# Patient Record
Sex: FEMALE | Race: OTHER | HISPANIC OR LATINO | URBAN - METROPOLITAN AREA
[De-identification: names, ages, dates, MRNs, and addresses within clinical notes are randomized per-mention and may not be internally consistent; named-entity substitution may affect disease eponyms.]

---

## 2023-09-10 ENCOUNTER — EMERGENCY (EMERGENCY)
Facility: HOSPITAL | Age: 33
LOS: 1 days | End: 2023-09-10
Attending: EMERGENCY MEDICINE
Payer: MEDICAID

## 2023-09-10 VITALS
OXYGEN SATURATION: 99 % | RESPIRATION RATE: 18 BRPM | TEMPERATURE: 98 F | DIASTOLIC BLOOD PRESSURE: 63 MMHG | HEART RATE: 67 BPM | SYSTOLIC BLOOD PRESSURE: 102 MMHG

## 2023-09-10 VITALS
HEART RATE: 73 BPM | OXYGEN SATURATION: 96 % | RESPIRATION RATE: 18 BRPM | SYSTOLIC BLOOD PRESSURE: 112 MMHG | DIASTOLIC BLOOD PRESSURE: 71 MMHG | WEIGHT: 131.4 LBS | TEMPERATURE: 98 F

## 2023-09-10 LAB
ALBUMIN SERPL ELPH-MCNC: 3.7 G/DL — SIGNIFICANT CHANGE UP (ref 3.5–5)
ALP SERPL-CCNC: 78 U/L — SIGNIFICANT CHANGE UP (ref 40–120)
ALT FLD-CCNC: 31 U/L DA — SIGNIFICANT CHANGE UP (ref 10–60)
ANION GAP SERPL CALC-SCNC: 5 MMOL/L — SIGNIFICANT CHANGE UP (ref 5–17)
APPEARANCE UR: CLEAR — SIGNIFICANT CHANGE UP
AST SERPL-CCNC: 13 U/L — SIGNIFICANT CHANGE UP (ref 10–40)
BASOPHILS # BLD AUTO: 0 K/UL — SIGNIFICANT CHANGE UP (ref 0–0.2)
BASOPHILS NFR BLD AUTO: 0 % — SIGNIFICANT CHANGE UP (ref 0–2)
BILIRUB SERPL-MCNC: 0.2 MG/DL — SIGNIFICANT CHANGE UP (ref 0.2–1.2)
BILIRUB UR-MCNC: NEGATIVE — SIGNIFICANT CHANGE UP
BUN SERPL-MCNC: 17 MG/DL — SIGNIFICANT CHANGE UP (ref 7–18)
CALCIUM SERPL-MCNC: 8.5 MG/DL — SIGNIFICANT CHANGE UP (ref 8.4–10.5)
CHLORIDE SERPL-SCNC: 111 MMOL/L — HIGH (ref 96–108)
CO2 SERPL-SCNC: 22 MMOL/L — SIGNIFICANT CHANGE UP (ref 22–31)
COLOR SPEC: YELLOW — SIGNIFICANT CHANGE UP
CREAT SERPL-MCNC: 0.87 MG/DL — SIGNIFICANT CHANGE UP (ref 0.5–1.3)
DIFF PNL FLD: ABNORMAL
EGFR: 90 ML/MIN/1.73M2 — SIGNIFICANT CHANGE UP
EOSINOPHIL # BLD AUTO: 0.3 K/UL — SIGNIFICANT CHANGE UP (ref 0–0.5)
EOSINOPHIL NFR BLD AUTO: 4 % — SIGNIFICANT CHANGE UP (ref 0–6)
GLUCOSE SERPL-MCNC: 95 MG/DL — SIGNIFICANT CHANGE UP (ref 70–99)
GLUCOSE UR QL: NEGATIVE MG/DL — SIGNIFICANT CHANGE UP
HCG SERPL-ACNC: <1 MIU/ML — SIGNIFICANT CHANGE UP
HCG UR QL: NEGATIVE — SIGNIFICANT CHANGE UP
HCT VFR BLD CALC: 36.1 % — SIGNIFICANT CHANGE UP (ref 34.5–45)
HGB BLD-MCNC: 11.8 G/DL — SIGNIFICANT CHANGE UP (ref 11.5–15.5)
KETONES UR-MCNC: NEGATIVE MG/DL — SIGNIFICANT CHANGE UP
LACTATE SERPL-SCNC: 0.5 MMOL/L — LOW (ref 0.7–2)
LEUKOCYTE ESTERASE UR-ACNC: NEGATIVE — SIGNIFICANT CHANGE UP
LIDOCAIN IGE QN: 37 U/L — SIGNIFICANT CHANGE UP (ref 13–75)
LYMPHOCYTES # BLD AUTO: 2.51 K/UL — SIGNIFICANT CHANGE UP (ref 1–3.3)
LYMPHOCYTES # BLD AUTO: 33 % — SIGNIFICANT CHANGE UP (ref 13–44)
MCHC RBC-ENTMCNC: 28.3 PG — SIGNIFICANT CHANGE UP (ref 27–34)
MCHC RBC-ENTMCNC: 32.7 GM/DL — SIGNIFICANT CHANGE UP (ref 32–36)
MCV RBC AUTO: 86.6 FL — SIGNIFICANT CHANGE UP (ref 80–100)
MONOCYTES # BLD AUTO: 0.68 K/UL — SIGNIFICANT CHANGE UP (ref 0–0.9)
MONOCYTES NFR BLD AUTO: 9 % — SIGNIFICANT CHANGE UP (ref 2–14)
NEUTROPHILS # BLD AUTO: 3.88 K/UL — SIGNIFICANT CHANGE UP (ref 1.8–7.4)
NEUTROPHILS NFR BLD AUTO: 51 % — SIGNIFICANT CHANGE UP (ref 43–77)
NITRITE UR-MCNC: NEGATIVE — SIGNIFICANT CHANGE UP
NT-PROBNP SERPL-SCNC: 34 PG/ML — SIGNIFICANT CHANGE UP (ref 0–125)
PH UR: 5.5 — SIGNIFICANT CHANGE UP (ref 5–8)
PLATELET # BLD AUTO: 265 K/UL — SIGNIFICANT CHANGE UP (ref 150–400)
POTASSIUM SERPL-MCNC: 3.6 MMOL/L — SIGNIFICANT CHANGE UP (ref 3.5–5.3)
POTASSIUM SERPL-SCNC: 3.6 MMOL/L — SIGNIFICANT CHANGE UP (ref 3.5–5.3)
PROT SERPL-MCNC: 7.3 G/DL — SIGNIFICANT CHANGE UP (ref 6–8.3)
PROT UR-MCNC: NEGATIVE MG/DL — SIGNIFICANT CHANGE UP
RBC # BLD: 4.17 M/UL — SIGNIFICANT CHANGE UP (ref 3.8–5.2)
RBC # FLD: 13.7 % — SIGNIFICANT CHANGE UP (ref 10.3–14.5)
SODIUM SERPL-SCNC: 138 MMOL/L — SIGNIFICANT CHANGE UP (ref 135–145)
SP GR SPEC: 1.01 — SIGNIFICANT CHANGE UP (ref 1–1.03)
TROPONIN I, HIGH SENSITIVITY RESULT: 3.8 NG/L — SIGNIFICANT CHANGE UP
UROBILINOGEN FLD QL: 0.2 MG/DL — SIGNIFICANT CHANGE UP (ref 0.2–1)
WBC # BLD: 7.6 K/UL — SIGNIFICANT CHANGE UP (ref 3.8–10.5)
WBC # FLD AUTO: 7.6 K/UL — SIGNIFICANT CHANGE UP (ref 3.8–10.5)

## 2023-09-10 PROCEDURE — 36415 COLL VENOUS BLD VENIPUNCTURE: CPT

## 2023-09-10 PROCEDURE — 85025 COMPLETE CBC W/AUTO DIFF WBC: CPT

## 2023-09-10 PROCEDURE — 96374 THER/PROPH/DIAG INJ IV PUSH: CPT | Mod: XU

## 2023-09-10 PROCEDURE — 87086 URINE CULTURE/COLONY COUNT: CPT

## 2023-09-10 PROCEDURE — 74177 CT ABD & PELVIS W/CONTRAST: CPT | Mod: 26,MA

## 2023-09-10 PROCEDURE — 84702 CHORIONIC GONADOTROPIN TEST: CPT

## 2023-09-10 PROCEDURE — 81025 URINE PREGNANCY TEST: CPT

## 2023-09-10 PROCEDURE — 81001 URINALYSIS AUTO W/SCOPE: CPT

## 2023-09-10 PROCEDURE — 74177 CT ABD & PELVIS W/CONTRAST: CPT | Mod: MA

## 2023-09-10 PROCEDURE — 99053 MED SERV 10PM-8AM 24 HR FAC: CPT

## 2023-09-10 PROCEDURE — 93005 ELECTROCARDIOGRAM TRACING: CPT

## 2023-09-10 PROCEDURE — 83605 ASSAY OF LACTIC ACID: CPT

## 2023-09-10 PROCEDURE — 83690 ASSAY OF LIPASE: CPT

## 2023-09-10 PROCEDURE — 83880 ASSAY OF NATRIURETIC PEPTIDE: CPT

## 2023-09-10 PROCEDURE — 84484 ASSAY OF TROPONIN QUANT: CPT

## 2023-09-10 PROCEDURE — 99285 EMERGENCY DEPT VISIT HI MDM: CPT

## 2023-09-10 PROCEDURE — 99285 EMERGENCY DEPT VISIT HI MDM: CPT | Mod: 25

## 2023-09-10 PROCEDURE — 80053 COMPREHEN METABOLIC PANEL: CPT

## 2023-09-10 RX ORDER — SODIUM CHLORIDE 9 MG/ML
1000 INJECTION INTRAMUSCULAR; INTRAVENOUS; SUBCUTANEOUS ONCE
Refills: 0 | Status: COMPLETED | OUTPATIENT
Start: 2023-09-10 | End: 2023-09-10

## 2023-09-10 RX ORDER — KETOROLAC TROMETHAMINE 30 MG/ML
15 SYRINGE (ML) INJECTION ONCE
Refills: 0 | Status: DISCONTINUED | OUTPATIENT
Start: 2023-09-10 | End: 2023-09-10

## 2023-09-10 RX ADMIN — SODIUM CHLORIDE 1000 MILLILITER(S): 9 INJECTION INTRAMUSCULAR; INTRAVENOUS; SUBCUTANEOUS at 02:51

## 2023-09-10 RX ADMIN — Medication 15 MILLIGRAM(S): at 03:16

## 2023-09-10 RX ADMIN — Medication 15 MILLIGRAM(S): at 03:46

## 2023-09-10 NOTE — ED PROVIDER NOTE - PATIENT PORTAL LINK FT
You can access the FollowMyHealth Patient Portal offered by [facility] by registering at the following website: [email or web address]. By joining [employer]’s FollowMyHealth portal, you will also be able to view your health information using other applications (apps) compatible with our system.

## 2023-09-10 NOTE — ED ADULT NURSE NOTE - ED STAT RN HANDOFF DETAILS 2
Re-evaluated by Dr. Paul with [facility] teaching and instructions rendered. IV access removed and no bleeding noted. patient is discharge in stable condition.

## 2023-09-10 NOTE — ED ADULT NURSE NOTE - ED STAT RN HANDOFF DETAILS
Patient A&Ox4 pending disposition, IV intact, no redness or swelling noted.  endorsed to Yeimy KAYE.

## 2023-09-10 NOTE — ED PROVIDER NOTE - PHYSICAL EXAMINATION
Afebrile, hemodynamically stable, saturating well on room air  NAD, well appearing, sitting comfortably in bed, no WOB/tachypnea, speaking full sentences  Head NCAT  EOMI grossly, anicteric  MMM  No JVD  RRR, nml S1/S2, no m/r/g  Lungs CTAB, no w/r/r  Abd soft, NT, ND, nml BS, no rebound or guarding, + L CVAT  AAO, CN's 3-12 grossly intact  GUTIERREZ spontaneously, no leg cyanosis or edema, 2+ symmetric radial pulses  Skin warm, well perfused, no rashes or hives

## 2023-09-10 NOTE — ED ADULT NURSE NOTE - NSFALLRISKASMTTYPE_ED_ALL_ED
Initial (On Arrival) Patient desires tubal ligation/1st Trimester Sonogram/20 Week Level II Sonogram

## 2023-09-10 NOTE — ED ADULT TRIAGE NOTE - WEIGHT IN LBS
[address] [facility] [place]  Department of Emergency Medicine   ED  Encounter Note  Admit Date/RoomTime: 2021 11:28 AM  ED Room:   NAME: Stiven Segundo  : 1983  MRN: [id number]     Chief Complaint:  Rectal Bleeding (has been ill for a few weeks. Started again yesterday with abd cramping and rectal bleeding. )    HISTORY OF PRESENT ILLNESS        Stiven Segundo is a 34 y.o. female who presents to the ED for evaluation of rectal bleeding that began yesterday. Patient states she had what she thought to be a foodborne illness a few days prior to Thanksgiving after an office party. Those symptoms resolved the day before she got her Covid booster and then she developed the typical symptoms after a COVID booster and then she attributed her symptoms to receiving the booster. She then had a day or so of no symptoms and then abruptly had abdominal cramping, nausea without vomiting and scant rectal bleeding last night. She has had no associated syncope, fever, chest pain, shortness of breath, constipation, dysuria, hematuria, vaginal bleeding or associated traumatic incident. She is not on any anticoagulants, antiplatelets or takes any frequent NSAIDs. She has concern because she has a history of cervical cancer and her brother who is 37 has had rectal cancer. She has never had a colonoscopy. She checked herself for hemorrhoids as she had a hemorrhoid with her pregnancy of her daughter who is now 10 but did not notice any. Her symptoms are aggravated by nothing in particular and she has had three episodes of bleeding total.  Her symptoms are mild to moderate severity and persistent nature. ROS   Pertinent positives and negatives are stated within HPI, all other systems reviewed and are negative.     Past Medical History:  has a past medical history of Atelectasis, Chest pain, Chest wall pain, D-dimer, elevated, Depression, Depression, Hydronephrosis, Meningitis, viral, Seasonal affective disorder ([facility] [address] and Seizures ([facility] [address] Surgical History:  has a past surgical history that includes Ovary surgery; Dilation and curettage of uterus; Hysterectomy; and partial hysterectomy (cervix not removed) (2016). Social History:  reports that she has been smoking cigarettes. She has been smoking about 1.00 pack per day. She has never used smokeless tobacco. She reports that she does not drink alcohol and does not use drugs. Family History: family history includes Cancer in her mother and paternal grandmother; Coronary Art Dis in her father; Heart Disease in her father, paternal aunt, and paternal uncle; High Blood Pressure in her brother, father, paternal aunt, and paternal uncle; Other in her mother. Allergies: Serotonin reuptake inhibitors (ssris), Sulfamethoxazole-trimethoprim, and Sulfa antibiotics    PHYSICAL EXAM   Oxygen Saturation Interpretation: Normal on room air analysis. ED Triage Vitals [12/05/21 1118]   BP Temp Temp src Pulse Resp SpO2 Height Weight   121/77 97.5 °F (36.4 °C) -- 88 16 100 % -- 186 lb (84.4 kg)       Physical Exam  Constitutional/General: Alert and oriented x3, well appearing, non toxic  HEENT:  NC/NT. PERRLA. Conjunctiva red. Airway patent. Neck: Supple, full ROM. No midline vertebral tenderness or crepitus. Respiratory: Lung sounds clear to auscultation bilaterally. No wheezes, rhonchi or stridor. Not in respiratory distress. CV:  Regular rate. No resting tachycardia. Regular rhythm. No murmurs or rubs. 2+ distal pulses. GI:  Abdomen soft, mild LLQ tenderness without guarding, non-distended. +BS. No rebound or rigidity. No pulsatile masses. Rectal Exam: (Same sex / third party chaperone, Wesley Barragan EA present during examination). No obvious hemorrhoid or anal fissure. No impacted stool in the vault, mass or tenderness. Soft brown stool with scant red mucus test positive for hemoccult. Back: No CVA tenderness bilaterally.  No 15.5 g/dL    Hematocrit 50.5 (H) 34.0 - 48.0 %    MCV 89.1 80.0 - 99.9 fL    MCH 29.8 26.0 - 35.0 pg    MCHC 33.5 32.0 - 34.5 %    RDW 12.4 11.5 - 15.0 fL    Platelets 348 283 - 465 E9/L    MPV 9.8 7.0 - 12.0 fL    Neutrophils % 59.0 43.0 - 80.0 %    Immature Granulocytes % 0.2 0.0 - 5.0 %    Lymphocytes % 30.2 20.0 - 42.0 %    Monocytes % 6.0 2.0 - 12.0 %    Eosinophils % 4.3 0.0 - 6.0 %    Basophils % 0.3 0.0 - 2.0 %    Neutrophils Absolute 5.39 1.80 - 7.30 E9/L    Immature Granulocytes # 0.02 E9/L    Lymphocytes Absolute 2.76 1.50 - 4.00 E9/L    Monocytes Absolute 0.55 0.10 - 0.95 E9/L    Eosinophils Absolute 0.39 0.05 - 0.50 E9/L    Basophils Absolute 0.03 0.00 - 0.20 E9/L   Lipase   Result Value Ref Range    Lipase 15 13 - 60 U/L   Lactate, Sepsis   Result Value Ref Range    Lactic Acid, Sepsis 1.1 0.5 - 1.9 mmol/L     Imaging: All Radiology results interpreted by Radiologist unless otherwise noted. CT ABDOMEN PELVIS W IV CONTRAST Additional Contrast? None   Final Result   1. Findings suggest infectious or inflammatory colitis involving distal   transverse colon and left colon. Follow-up may be helpful to assure   resolution. 2. No bowel obstruction, free air, or free fluid. ED Course / Medical Decision Making     Medications   dicyclomine (BENTYL) injection 20 mg (20 mg IntraMUSCular Given 12/5/21 1236)   0.9 % sodium chloride bolus (0 mLs IntraVENous Stopped 12/5/21 1335)   iopamidol (ISOVUE-370) 76 % injection 75 mL (75 mLs IntraVENous Given 12/5/21 1329)        Re-examination:  12/5/21       Time: 9209  Patients condition is improving after treatment and remains stable. No episodes of rectal bleeding. Patient updated on results. Pending CT. Time: 1450  Patient sleeping, arouses to tactile stimuli. She states she is feeling better other than her stomach \"still feeling bubbly. \" She has had no emesis or rectal bleeding here in ED. Abdomen remains soft without rigidity or guarding. Discussed CT result and outpatient management plan. Consult(s):   None    Procedure(s):   none    MDM:   Patient evaluated by myself with labs and diagnostics as resulted above. Differential diagnosis of diverticulitis, hemorrhoid, anal fissure, and infectious diarrhea to mention a few. CT is interpreted by radiologist showing infectious or inflammatory colitis without bowel obstruction or free air. She has no leukocytosis, fever, hypotension or anemia and no previous history of diverticulitis therefore she will not be managed with antibiotic therapy at this time which she is amenable to. She had improvement with IV fluids and Bentyl in ED. Plan is for clear liquid diet advancing as tolerated, Bentyl and Zofran as needed and outpatient follow-up with primary care. Patient is nontoxic, not hypoxic and appropriate for this outpatient management plan they prefer. They are encouraged to arrange close follow-up as discussed as well as provided in a written handout of discharge instructions. Patient was educated on signs and symptoms to watch for indicative of reevaluation in the emergency department setting to include any worsening of current symptoms. Patient verbalized understanding of instructions and is amenable to this treatment plan. Patient departed in stable condition. Plan of Care/Counseling:  USHA Biswas CNP reviewed today's visit with the patient and spouse / life partner in addition to providing specific details for the plan of care and counseling regarding the diagnosis and prognosis. Questions are answered at this time and are agreeable with the plan. ASSESSMENT     1. Colitis    2. Rectal bleeding      PLAN   Discharged home.   Patient condition is stable    New Medications     Discharge Medication List as of 12/5/2021  3:06 PM      START taking these medications    Details   dicyclomine (BENTYL) 10 MG capsule Take 1 capsule by mouth 4 times daily as needed (abd cramping), Disp-28 capsule, R-0Normal      ondansetron (ZOFRAN-ODT) 4 MG disintegrating tablet Take 1 tablet by mouth every 8 hours as needed for Nausea or Vomiting, Disp-15 tablet, R-0Normal           Electronically signed by USHA Becerra CNP   DD: 12/5/21  **This report was transcribed using voice recognition software. Every effort was made to ensure accuracy; however, inadvertent computerized transcription errors may be present.   END OF ED PROVIDER NOTE        USHA Becerra CNP  12/05/21 4571 131.3

## 2023-09-10 NOTE — ED ADULT NURSE NOTE - NSFALLUNIVINTERV_ED_ALL_ED
Bed/Stretcher in lowest position, wheels locked, appropriate side rails in place/Call bell, personal items and telephone in reach/Instruct patient to call for assistance before getting out of bed/chair/stretcher/Non-slip footwear applied when patient is off stretcher/[place] to call system/Physically safe environment - no spills, clutter or unnecessary equipment/Purposeful proactive rounding/Room/bathroom lighting operational, light cord in reach

## 2023-09-10 NOTE — ED PROVIDER NOTE - CLINICAL SUMMARY MEDICAL DECISION MAKING FREE TEXT BOX
Abdomen benign, with low suspicion for acute intra-abdominal process, and CT negative. Character, appearance low suspicion for ovarian torsion to warrant ultrasound imaging. Character, appearance, exam low suspicion for dissection or mesenteric ischemia to warrant angio imaging. Character of some suspicion for kidney stone, however no acute pathology noted on CT and UA unremarkable, no evidence of UTI and low suspicion for pyelonephritis. Character low suspicion for dissection and no murmur or pulse asymmetry. Character low suspicion for PE and PERC negative. Character low suspicion for ACS and ECG/trop unremarkable. No e/o PNA, PTX, or fluid overload on exam. Given Tylenol with improvement. Patient is well appearing, NAD, afebrile, hemodynamically stable. Any available tests and studies were discussed with patient and . Discharged with instructions in further symptomatic care including using cyclobenzaprine prescribed by PMD for this complaint, return precautions, and need for PMD f/u.

## 2023-09-10 NOTE — ED PROVIDER NOTE - OBJECTIVE STATEMENT
Declines , uses herself.  32-year-old female, previously healthy, no past surgical history, presents with left finger pain, present since last week, worsened for the past 3 days, sharp in character, radiating forward to the left upper abdomen and to the left chest. Worsened with movements and deep breaths. Associated urinary frequency for the past 3 days. Denies dysuria, hematuria, shortness of breath, cough, fever, vomiting, diarrhea, constipation, leg pain or swelling, recent unusual travel, OCP use, and all other symptoms. Denies family history of CAD.

## 2023-09-10 NOTE — ED PROVIDER NOTE - NSFOLLOWUPINSTRUCTIONS_ED_ALL_ED_FT
Please follow up with your primary care doctor in 1-2 days.  Please use ibuprofen and/or acetaminophen and/or cyclobenzaprine as needed for pain.  Please return to the emergency department if you have worsening pain, shortness of breath, dizziness, vomiting, fever, or any other symptoms.    Flank Pain, Adult  Flank pain is pain in your side. The flank is the area on your side between your upper belly (abdomen) and your spine. The pain may occur over a short time (acute), or it may be long-term or come back often (chronic). It may be mild or very bad. Pain in this area can be caused by many different things.    Follow these instructions at home:  Three cups showing dark yellow, yellow, and pale yellow pee.  Drink enough fluid to keep your pee (urine) pale yellow.  Rest as told by your doctor.  Take over-the-counter and prescription medicines only as told by your doctor.  Keep a journal to keep track of:  What has caused your flank pain.  What has made your flank pain feel better.  Keep all follow-up visits.  Contact a doctor if:  Medicine does not help your pain.  You have new symptoms.  Your pain gets worse.  Your symptoms last longer than 2–3 days.  You have trouble peeing.  You are peeing more often than normal.  Get help right away if:  You have trouble breathing.  You are short of breath.  Your belly hurts, or it is swollen or red.  You feel like you may vomit (nauseous).  You vomit.  You feel faint, or you faint.  You have blood in your pee.  You have flank pain and a fever.  These symptoms may be an emergency. Get help right away. Call your local emergency services (911 in the [place]  Do not wait to see if the symptoms will go away.  Do not drive yourself to the hospital.  Summary  Flank pain is pain in your side. The flank is the area of your side between your upper belly (abdomen) and your spine.  Flank pain may occur over a short time (acute), or it may be long-term or come back often (chronic). It may be mild or very bad.  Pain in this area can be caused by many different things.  Contact your doctor if your symptoms get worse or last longer than 2–3 days.  This information is not intended to replace advice given to you by your health care provider. Make sure you discuss any questions you have with your health care provider.

## 2023-09-11 LAB
CULTURE RESULTS: SIGNIFICANT CHANGE UP
SPECIMEN SOURCE: SIGNIFICANT CHANGE UP

## 2025-02-28 NOTE — ED ADULT NURSE NOTE - NSFALLLASTSIX_ED_ALL_ED
LOV 7/22/24  LRF 12/30/24  RTO My chart message sent      Health Maintenance   Topic Date Due    A1C test (Diabetic or Prediabetic)  07/24/2024    Flu vaccine (1) 08/01/2024    COVID-19 Vaccine (1 - 2024-25 season) Never done    DTaP/Tdap/Td vaccine (7 - Td or Tdap) 04/10/2025 (Originally 10/20/2020)    Depression Monitoring  04/09/2025    Hepatitis A vaccine  Completed    Hepatitis B vaccine  Completed    Hib vaccine  Completed    HPV vaccine  Completed    Polio vaccine  Completed    Meningococcal (ACWY) vaccine  Completed    Pneumococcal 0-49 years Vaccine  Aged Out    Varicella vaccine  Discontinued    Depression Screen  Discontinued    Hepatitis C screen  Discontinued    HIV screen  Discontinued             (applicable per patient's age: Cancer Screenings, Depression Screening, Fall Risk Screening, Immunizations)    Hemoglobin A1C (%)   Date Value   07/24/2023 5.5   06/07/2022 5.8     AST (U/L)   Date Value   06/07/2022 23     ALT (U/L)   Date Value   06/07/2022 32     BUN (mg/dL)   Date Value   04/08/2024 15      (goal A1C is < 7)   (goal LDL is <100) need 30-50% reduction from baseline     BP Readings from Last 3 Encounters:   04/10/24 138/88   04/08/24 (!) 126/96   07/24/23 126/88    (goal /80)      All Future Testing planned in [facility]:  Lab Frequency Next Occurrence       Next Visit Date:  No future appointments.         Patient Active Problem List:     Severe obesity (BMI >= 40)     History of chickenpox     Attention deficit hyperactivity disorder (ADHD), combined type     Elevated blood pressure reading     Hypertension     Anxiety     Depression     Family history of MI (myocardial infarction)     Prediabetes     Chronic post-traumatic stress disorder     Tachycardia     Suicidal ideations     Medication side effect    
No.